# Patient Record
Sex: MALE | Race: WHITE | Employment: FULL TIME | URBAN - METROPOLITAN AREA
[De-identification: names, ages, dates, MRNs, and addresses within clinical notes are randomized per-mention and may not be internally consistent; named-entity substitution may affect disease eponyms.]

---

## 2019-05-13 ENCOUNTER — HOSPITAL ENCOUNTER (EMERGENCY)
Age: 34
Discharge: HOME | End: 2019-05-13
Attending: EMERGENCY MEDICINE
Payer: COMMERCIAL

## 2019-05-13 ENCOUNTER — APPOINTMENT (OUTPATIENT)
Dept: VASCULAR LAB | Age: 34
End: 2019-05-13
Payer: COMMERCIAL

## 2019-05-13 VITALS
OXYGEN SATURATION: 100 % | DIASTOLIC BLOOD PRESSURE: 86 MMHG | RESPIRATION RATE: 16 BRPM | WEIGHT: 160 LBS | SYSTOLIC BLOOD PRESSURE: 135 MMHG | HEART RATE: 78 BPM | TEMPERATURE: 97.9 F

## 2019-05-13 DIAGNOSIS — Z86.2 H/O PROTEIN S DEFICIENCY: ICD-10-CM

## 2019-05-13 DIAGNOSIS — I82.592 CHRONIC DEEP VEIN THROMBOSIS (DVT) OF OTHER VEIN OF LEFT LOWER EXTREMITY (HCC): ICD-10-CM

## 2019-05-13 DIAGNOSIS — I82.532 CHRONIC DEEP VEIN THROMBOSIS (DVT) OF POPLITEAL VEIN OF LEFT LOWER EXTREMITY (HCC): ICD-10-CM

## 2019-05-13 DIAGNOSIS — I82.4Z2 ACUTE DEEP VEIN THROMBOSIS (DVT) OF DISTAL VEIN OF LEFT LOWER EXTREMITY (HCC): Primary | ICD-10-CM

## 2019-05-13 DIAGNOSIS — Z79.01 WARFARIN ANTICOAGULATION: ICD-10-CM

## 2019-05-13 LAB
ANION GAP SERPL CALCULATED.3IONS-SCNC: 12 MMOL/L (ref 3–16)
BASE EXCESS VENOUS: 3 (ref -3–3)
BUN BLDV-MCNC: 8 MG/DL (ref 7–20)
CALCIUM SERPL-MCNC: 9.3 MG/DL (ref 8.3–10.6)
CHLORIDE BLD-SCNC: 100 MMOL/L (ref 99–110)
CO2: 28 MMOL/L (ref 21–32)
CREAT SERPL-MCNC: 0.6 MG/DL (ref 0.9–1.3)
D DIMER: 265 NG/ML DDU (ref 0–229)
GFR AFRICAN AMERICAN: >60
GFR NON-AFRICAN AMERICAN: >60
GLUCOSE BLD-MCNC: 96 MG/DL (ref 70–99)
HCO3 VENOUS: 27.5 MMOL/L (ref 23–29)
INR BLD: 1.26 (ref 0.86–1.14)
O2 SAT, VEN: 57 %
PCO2, VEN: 44.6 MM HG (ref 40–50)
PERFORMED ON: NORMAL
PH VENOUS: 7.4 (ref 7.35–7.45)
PO2, VEN: 30 MM HG
POC SAMPLE TYPE: NORMAL
POTASSIUM REFLEX MAGNESIUM: 3.9 MMOL/L (ref 3.5–5.1)
PROTHROMBIN TIME: 14.4 SEC (ref 9.8–13)
SODIUM BLD-SCNC: 140 MMOL/L (ref 136–145)
TCO2 CALC VENOUS: 29 MMOL/L

## 2019-05-13 PROCEDURE — 82803 BLOOD GASES ANY COMBINATION: CPT

## 2019-05-13 PROCEDURE — 36415 COLL VENOUS BLD VENIPUNCTURE: CPT

## 2019-05-13 PROCEDURE — 6360000002 HC RX W HCPCS: Performed by: STUDENT IN AN ORGANIZED HEALTH CARE EDUCATION/TRAINING PROGRAM

## 2019-05-13 PROCEDURE — 80048 BASIC METABOLIC PNL TOTAL CA: CPT

## 2019-05-13 PROCEDURE — 93970 EXTREMITY STUDY: CPT

## 2019-05-13 PROCEDURE — 6370000000 HC RX 637 (ALT 250 FOR IP): Performed by: STUDENT IN AN ORGANIZED HEALTH CARE EDUCATION/TRAINING PROGRAM

## 2019-05-13 PROCEDURE — 85610 PROTHROMBIN TIME: CPT

## 2019-05-13 PROCEDURE — 99284 EMERGENCY DEPT VISIT MOD MDM: CPT

## 2019-05-13 PROCEDURE — 96372 THER/PROPH/DIAG INJ SC/IM: CPT

## 2019-05-13 PROCEDURE — 85379 FIBRIN DEGRADATION QUANT: CPT

## 2019-05-13 RX ORDER — WARFARIN SODIUM 1 MG/1
9.5 TABLET ORAL DAILY
COMMUNITY

## 2019-05-13 RX ORDER — INSULIN GLARGINE 100 [IU]/ML
18 INJECTION, SOLUTION SUBCUTANEOUS EVERY MORNING
COMMUNITY

## 2019-05-13 RX ORDER — OXYCODONE HYDROCHLORIDE 5 MG/1
5 TABLET ORAL ONCE
Status: DISCONTINUED | OUTPATIENT
Start: 2019-05-13 | End: 2019-05-13

## 2019-05-13 RX ORDER — IBUPROFEN 400 MG/1
800 TABLET ORAL ONCE
Status: COMPLETED | OUTPATIENT
Start: 2019-05-13 | End: 2019-05-13

## 2019-05-13 RX ORDER — EPINEPHRINE 1 MG/ML
INJECTION, SOLUTION, CONCENTRATE INTRAVENOUS
Status: DISCONTINUED
Start: 2019-05-13 | End: 2019-05-13 | Stop reason: HOSPADM

## 2019-05-13 RX ADMIN — IBUPROFEN 800 MG: 400 TABLET, FILM COATED ORAL at 18:58

## 2019-05-13 RX ADMIN — ENOXAPARIN SODIUM 100 MG: 100 INJECTION SUBCUTANEOUS at 19:45

## 2019-05-13 ASSESSMENT — ENCOUNTER SYMPTOMS
BLOOD IN STOOL: 0
ABDOMINAL PAIN: 0
WHEEZING: 0
SHORTNESS OF BREATH: 0
DIARRHEA: 0
VOMITING: 0
SORE THROAT: 0
RHINORRHEA: 0
CONSTIPATION: 0
COUGH: 0
CHEST TIGHTNESS: 0
NAUSEA: 0

## 2019-05-13 ASSESSMENT — PAIN SCALES - GENERAL
PAINLEVEL_OUTOF10: 7
PAINLEVEL_OUTOF10: 5

## 2019-05-13 ASSESSMENT — PAIN DESCRIPTION - ORIENTATION: ORIENTATION: LEFT

## 2019-05-13 ASSESSMENT — PAIN DESCRIPTION - LOCATION: LOCATION: LEG

## 2019-05-13 NOTE — ED NOTES
Provided patient with a Pepsi as patient stated his blood glucose was getting low and he is a diabetic. Patient preferred a Pepsi over a sandwich.      Malissa Guerra RN  05/13/19 0912

## 2019-05-13 NOTE — ED PROVIDER NOTES
ED Attending Attestation Note     Date of evaluation: 5/13/2019    This patient was seen by the resident. I have seen and examined the patient, agree with the workup, evaluation, management and diagnosis. The care plan has been discussed. I have reviewed the ECG and concur with the resident's interpretation. My assessment reveals a 36year old male with history of DVT and protein S deficiency on life-long AC with warfarin who presented with left knee and calf pain. I cannot detect and swelling or warmth. He has clear lungs with a regular rate and rhythm. He adamantly denies chest pain or shortness of breath. Will obtain lower extremity duplex to assess for new DVT.           Frank Jimenez MD  05/13/19 5906

## 2019-05-13 NOTE — ED NOTES
Pt reports left leg/ knee pain. Feels like a previous blood clot. Denies cp or sob. Skin warm and dry. Even easy resp.      Carmella Ibarra RN  05/13/19 1610

## 2019-05-13 NOTE — ED PROVIDER NOTES
1 [facility]  EMERGENCY DEPARTMENT ENCOUNTER          [facility] RESIDENT NOTE       Date of evaluation: 5/13/2019    Chief Complaint     Leg Pain (possible clot)    History of Present Illness     Frantz Bojorquez is a 36 y.o. male who presents with leg pain. The patient has a history of 2-3 DVT's of his left lower extremity on chart review and feels that his symptoms are very similar to his previous episodes of DVT's. He endorses tenderness of the medial popliteal fossa and inability to fully bear weight on his leg, limping at times. He takes warfarin 9.5 mg every morning (took a dose today and yesterday), however he has been traveling and missing doses every few days. [place] [facility] [place]. Patient follows in [facility] clinic, but has not been there for several months. He has mild, chronic left lower extremity swelling that is unchanged. He denies any shortness of breath, chest pain, hemoptysis, syncope or near-syncope. He has taken tylenol without significant relief of symptoms and denies trauma or injury to the leg or joint. No recent surgeries, no history of cancer, no other prolonged immobilization. Review of Systems     Review of Systems   Constitutional: Negative for chills, fatigue and fever. HENT: Negative for congestion, rhinorrhea and sore throat. Eyes: Negative for visual disturbance. Respiratory: Negative for cough, chest tightness, shortness of breath and wheezing. Cardiovascular: Positive for leg swelling (chronic). Negative for chest pain. Gastrointestinal: Negative for abdominal pain, blood in stool, constipation, diarrhea, nausea and vomiting. Genitourinary: Negative for difficulty urinating. Musculoskeletal: Negative for gait problem and joint swelling. Tenderness over medial popliteal fossa     Skin: Negative for rash and wound. Neurological: Negative for dizziness, syncope, weakness, light-headedness, numbness and headaches.  enoxaparin (LOVENOX) injection 100 mg       CONSULTS:  None    MEDICAL DECISION MAKING / ASSESSMENT / PLAN     Payal King is a 36 y.o. male with a past medical history of 2-3 DVT's of his left lower extremity on life-long anticoagulation with warfarin, protein S deficiency, and type I diabetes mellitus that presented with acute left lower extremity pain and weakness. My physical exam revealed a mildly tender popliteal fossa of the left leg without significant unilateral leg swelling. The patient's INR was subtherapeutic at 1.46, and although his Well's score for DVT placed him at moderate risk, suspicion for DVT was high. Bilateral lower extremity venous dopplers showed an acute on chronic partially occluding DVT of the left gastrocnemius and a chronic DVT of the popliteal.The patient's SpO2 on RA was % without tachycardia, respiratory distress, chest pain, hemoptysis, syncope or near syncope decreasing my suspicion for pulmonary embolism. A call was placed to his primary care doctor's office, and I spoke with the physician on call to formulate a plan of care following emergency room discharge. The patient was given 100 mg of Lovenox with adequate Cr clearance and will take his warfarin tomorrow morning. He has a follow-up appointment scheduled tomorrow afternoon with his PCP to monitor INR, continue care and evaluate possibility of starting a NOAC. He was given pain control medication. The patient was informed of this assessment, voiced understanding and was agreeable to the plan of care. This patient was also evaluated by the attending physician. All care plans were discussed and agreed upon. Clinical Impression     1. Acute deep vein thrombosis (DVT) of distal vein of left lower extremity ([address]    2. Chronic deep vein thrombosis (DVT) of popliteal vein of left lower extremity (HCC)    3. Chronic deep vein thrombosis (DVT) of other vein of left lower extremity (HCC)    4.  H/O protein S deficiency    5. Warfarin anticoagulation        Disposition     PATIENT REFERRED TO:  Tammi Heller MD  [address]  [phone]    Go in 1 day  at 1:00 pm for acute DVT follow up and anticoagulation.       DISCHARGE MEDICATIONS:  Discharge Medication List as of 5/13/2019  7:42 PM          DISPOSITION Decision To Discharge 05/13/2019 07:57:02 PM       Analilia Gunderson MD  Resident  05/13/19 7263

## 2019-05-13 NOTE — ED NOTES
Called lab inquiring about lab results. Lab informed me that the blood was passed the 4 hour window and they couldn't find the Green top for the BMP. I drew the labs for the PT and BMP and sent them down as instructed by lab.      Dyan Pina RN  05/13/19 9166

## 2021-07-10 ENCOUNTER — HOSPITAL ENCOUNTER (EMERGENCY)
Age: 36
Discharge: HOME | End: 2021-07-10
Attending: EMERGENCY MEDICINE
Payer: COMMERCIAL

## 2021-07-10 VITALS
RESPIRATION RATE: 18 BRPM | TEMPERATURE: 98 F | HEART RATE: 69 BPM | BODY MASS INDEX: 21.67 KG/M2 | SYSTOLIC BLOOD PRESSURE: 155 MMHG | OXYGEN SATURATION: 100 % | WEIGHT: 160 LBS | HEIGHT: 72 IN | DIASTOLIC BLOOD PRESSURE: 86 MMHG

## 2021-07-10 DIAGNOSIS — T14.8XXA BITE BY ANIMAL: Primary | ICD-10-CM

## 2021-07-10 DIAGNOSIS — R03.0 ELEVATED BLOOD PRESSURE READING: ICD-10-CM

## 2021-07-10 PROCEDURE — 99283 EMERGENCY DEPT VISIT LOW MDM: CPT

## 2021-07-10 RX ORDER — AMOXICILLIN 500 MG/1
500 CAPSULE ORAL 3 TIMES DAILY
COMMUNITY
End: 2022-02-08

## 2021-07-10 RX ORDER — ATORVASTATIN CALCIUM 10 MG/1
10 TABLET, FILM COATED ORAL DAILY
COMMUNITY

## 2021-07-10 RX ORDER — HYDROCODONE BITARTRATE AND ACETAMINOPHEN 5; 325 MG/1; MG/1
1 TABLET ORAL EVERY 6 HOURS PRN
Qty: 12 TABLET | Refills: 0 | Status: SHIPPED | OUTPATIENT
Start: 2021-07-10 | End: 2021-07-13

## 2021-07-10 ASSESSMENT — PAIN DESCRIPTION - ORIENTATION: ORIENTATION: RIGHT

## 2021-07-10 ASSESSMENT — PAIN DESCRIPTION - PAIN TYPE: TYPE: ACUTE PAIN

## 2021-07-10 ASSESSMENT — PAIN SCALES - GENERAL: PAINLEVEL_OUTOF10: 8

## 2021-07-10 ASSESSMENT — PAIN DESCRIPTION - LOCATION: LOCATION: HAND

## 2021-07-10 NOTE — ED PROVIDER NOTES
Emergency Physician Note    Chief Complaint  Animal Bite       History of Present Illness  Yinka Irving is a 29 y.o. male who presents to the ED for animal bite to R thumb x14 hours ago. Patient was walking his dog yesterday when the neighbors dog got loose and attacked him. He was bit once on the dorsum at the base of MCP of the thumb. Shortly after getting bitten he reported to urgent care and was prescribed an antibiotic twice a day, which he has been taking. About 8 hours ago he took 2 tylenol for pain control without relief. Pain has continued to worsen since returning from urgent care last night which is what prompted the patient to come into the ED this morning. States that his tetanus is UTD, received a Tdap x3 years ago. Patient spoke to his neighbors and was assured that the dog who bit him is UTD with their rabies vaccines, he will be requesting formal records of the rabies vaccine. PMH: Protein S deficiency, DVT, type I DM       He checks his PT/INR regularly and states last check was within the normal therapeutic range       Denies fevers, chills, numbness       10 systems reviewed, pertinent positives per HPI otherwise noted to be negative      Nursing notes reviewed. ED Triage Vitals   Enc Vitals Group      BP 07/10/21 0701 (!) 155/86      Pulse 07/10/21 0701 69      Resp 07/10/21 0701 18      Temp 07/10/21 0702 98 °F (36.7 °C)      Temp Source 07/10/21 0702 Oral      SpO2 07/10/21 0701 100 %      Weight 07/10/21 0701 160 lb (72.6 kg)      Height 07/10/21 0701 6' (1.829 m)      Head Circumference --       Peak Flow --       Pain Score --       Pain Loc --       Pain Edu? --       Excl. in GC? --        GENERAL:  Awake, alert. Well developed, well nourished with no apparent distress. HENT:  Normocephalic, Atraumatic, moist mucous membranes.   EYES:  Pupils equal round and reactive to light, Conjunctiva normal, extraocular movements normal.  NECK:  No meningeal signs, Supple. CHEST:  Regular rate and rhythm, chest wall non-tender. LUNGS:  Clear to auscultation bilaterally. ABDOMEN:  Soft, non-tender, no rebound, rigidity or guarding, non-distended, normal bowel sounds. No costovertebral angle tenderness to palpation. BACK:  No tenderness. EXTREMITIES:  Normal range of motion, no edema, no bony tenderness, no deformity, distal pulses present. SKIN: Warm, dry and intact. R thumb is Tender, MCP shows small puncture wound, mild edema, ROM is reduced 2/2 pain. No signs of spreading erythema, no drainage  NEUROLOGIC: Normal mental status. Moving all extremities to command. LABS and DIAGNOSTIC RESULTS      RADIOLOGY  X-RAYS:  I have reviewed radiologic plain film image(s). ALL OTHER NON-PLAIN FILM IMAGES SUCH AS CT, ULTRASOUND AND MRI HAVE BEEN READ BY THE RADIOLOGIST. No orders to display        LABS  Labs Reviewed - No data to display    [place]              I advised the patient to return to the emergency department immediately for any new or worsening symptoms, such as fever, spreading redness, uncontrolled pain. The patient voiced agreement and understanding of the treatment plan. Patient was given scripts for the following medications. I counseled patient how to take these medications. Discharge Medication List as of 7/10/2021  7:41 AM      START taking these medications    Details   HYDROcodone-acetaminophen (NORCO) 5-325 MG per tablet Take 1 tablet by mouth every 6 hours as needed for Pain for up to 3 days. Sedation precautions please, Disp-12 tablet, R-0Normal             Disposition  Pt is in good condition upon Discharge to home. Clinical Impression  1.  Bite by animal           Dr. Kike Castellon, PGY2    Patient seen in collaboration with attending physician Dr. James Piedra, DO  Resident  07/12/21 9077

## 2021-07-10 NOTE — ED PROVIDER NOTES
I have personally performed a face to face diagnostic evaluation on this patient. I have fully participated in the care of this patient. I have reviewed and agree with all pertinent clinical information including history, physical exam, diagnostic tests, and the plan. This patient was seen with Resident Physician Regina Landers. History and Physical as follows:  55-year-old male with history of protein S deficiency and DVT, on Coumadin, history of type 1 diabetes mellitus, tetanus up-to-date with history of dog bite of his right hand last evening by his neighbors healthy immunized dog. This was provoked. Complains of pain in the right and first MCP area. He was seen in urgent care yesterday and started on either amoxicillin or Augmentin twice daily. Presents here complaining of some persistent pain despite taking 2 doses of Tylenol. No fever or chills. Distal neurovascular exam is intact. He has a small puncture wound on the dorsal radial aspect of the right hand over the thumb extensor tendons. He has full range of motion at the MCP and IP. Mild redness around the puncture wound but no lymphangitic streaking. No crepitance or drainage. Patient was advised to continue his antibiotic which is felt likely to be Augmentin. Recommended more rest and elevation. He was given a few Norco tablets to take if needed for pain unresponsive to Tylenol. Advise follow-up with his primary care doctor and return here for worse symptoms.     DX: 1) Bite by animal         2) elevated blood pressure reading        Jorge Guidry MD  07/10/21 [address] [facility] Christiano Avilez MD  07/10/21 1980

## 2022-02-08 ENCOUNTER — HOSPITAL ENCOUNTER (EMERGENCY)
Age: 37
Discharge: HOME | End: 2022-02-08
Attending: EMERGENCY MEDICINE
Payer: COMMERCIAL

## 2022-02-08 VITALS
WEIGHT: 174.6 LBS | RESPIRATION RATE: 12 BRPM | DIASTOLIC BLOOD PRESSURE: 70 MMHG | OXYGEN SATURATION: 100 % | BODY MASS INDEX: 24.44 KG/M2 | HEART RATE: 65 BPM | HEIGHT: 71 IN | SYSTOLIC BLOOD PRESSURE: 123 MMHG | TEMPERATURE: 98.3 F

## 2022-02-08 DIAGNOSIS — K08.89 ODONTALGIA: Primary | ICD-10-CM

## 2022-02-08 PROCEDURE — 6370000000 HC RX 637 (ALT 250 FOR IP): Performed by: EMERGENCY MEDICINE

## 2022-02-08 PROCEDURE — 99283 EMERGENCY DEPT VISIT LOW MDM: CPT

## 2022-02-08 RX ORDER — CLINDAMYCIN HYDROCHLORIDE 300 MG/1
300 CAPSULE ORAL ONCE
Status: COMPLETED | OUTPATIENT
Start: 2022-02-08 | End: 2022-02-08

## 2022-02-08 RX ORDER — CLINDAMYCIN HYDROCHLORIDE 300 MG/1
300 CAPSULE ORAL 4 TIMES DAILY
Qty: 40 CAPSULE | Refills: 0 | Status: SHIPPED | OUTPATIENT
Start: 2022-02-08 | End: 2022-02-18

## 2022-02-08 RX ORDER — NAPROXEN 500 MG/1
500 TABLET ORAL 2 TIMES DAILY
Qty: 20 TABLET | Refills: 0 | Status: SHIPPED | OUTPATIENT
Start: 2022-02-08 | End: 2022-02-18

## 2022-02-08 RX ORDER — NAPROXEN 500 MG/1
500 TABLET ORAL ONCE
Status: COMPLETED | OUTPATIENT
Start: 2022-02-08 | End: 2022-02-08

## 2022-02-08 RX ADMIN — CLINDAMYCIN HYDROCHLORIDE 300 MG: 300 CAPSULE ORAL at 19:52

## 2022-02-08 RX ADMIN — NAPROXEN 500 MG: 500 TABLET ORAL at 19:52

## 2022-02-08 ASSESSMENT — PAIN DESCRIPTION - PAIN TYPE: TYPE: ACUTE PAIN

## 2022-02-08 ASSESSMENT — PAIN DESCRIPTION - ORIENTATION: ORIENTATION: LEFT

## 2022-02-08 ASSESSMENT — PAIN DESCRIPTION - LOCATION: LOCATION: JAW

## 2022-02-08 ASSESSMENT — PAIN DESCRIPTION - DESCRIPTORS: DESCRIPTORS: ACHING

## 2022-02-08 ASSESSMENT — PAIN DESCRIPTION - FREQUENCY: FREQUENCY: CONTINUOUS

## 2022-02-08 ASSESSMENT — PAIN SCALES - GENERAL
PAINLEVEL_OUTOF10: 5
PAINLEVEL_OUTOF10: 5

## 2022-02-09 NOTE — ED PROVIDER NOTES
[facility]   eMERGENCY dEPARTMENT eNCOUnter      Pt Name: Letty Larson  MRN: [id number]  [place] 1985  Date of evaluation: 2/8/2022  Provider: Lauren Ceja MD  PCP: Flor Gutierrez MD      CHIEF COMPLAINT       Mouth pain    HISTORY OFPRESENT ILLNESS   (Location/Symptom, Timing/Onset, Context/Setting, Quality, Duration, Modifying Factors,Severity)  Note limiting factors. Letty Larson is a 29 y.o. male he said last night he started having pain on the left side of his mouth behind one of his molars then today felt like it was going down into his neck he denies any difficulty chewing denies any sensitivity to pain and temperature denies any fever or chills says his blood sugars been running a little higher    Nursing Notes were all reviewed and agreed with or any disagreements were addressed  in the HPI. REVIEW OF SYSTEMS    (2-9 systems for level 4, 10 or more for level 5)     Review of Systems    Positives and Pertinent negatives as per HPI. Except as noted above in the ROS, all other systems were reviewed andnegative. PASTMEDICAL HISTORY     Past Medical History:   Diagnosis Date    Celiac disease     Diabetes type 1, controlled ([facility]     DVT [facility]deep venous thrombosis) ([facility])     Protein S deficiency ([facility])          SURGICAL HISTORY       Past Surgical History:   Procedure Laterality Date    APPENDECTOMY           CURRENT MEDICATIONS       Previous Medications    AMOXICILLIN (AMOXIL) 500 MG CAPSULE    Take 500 mg by mouth 3 times daily    ATORVASTATIN (LIPITOR) 10 MG TABLET    Take 10 mg by mouth daily    INSULIN GLARGINE (LANTUS) 100 UNIT/ML INJECTION VIAL    Inject 18 Units into the skin every morning    INSULIN LISPRO (HUMALOG) 100 UNIT/ML INJECTION CARTRIDGE    Inject into the skin 3 times daily (before meals)    WARFARIN (COUMADIN) 1 MG TABLET    Take 9.5 mg by mouth daily       ALLERGIES     Patient has no known allergies.     FAMILY HISTORY No family history on file. SOCIAL HISTORY       Social History     Socioeconomic History    Marital status:      Spouse name: Not on file    Number of children: Not on file    Years of education: Not on file    Highest education level: Not on file   Occupational History    Not on file   Tobacco Use    Smoking status: Never Smoker    Smokeless tobacco: Never Used   Substance and Sexual Activity    Alcohol use: Yes     Comment: Occ    Drug use: Never    Sexual activity: Not on file   Other Topics Concern    Not on file   Social History Narrative    Not on file     Social Determinants of Health     Financial Resource Strain:     Difficulty of Paying Living Expenses: Not on file   Food Insecurity:     Worried About Running Out of Food in the Last Year: Not on file    Radha of Food in the Last Year: Not on file   Transportation Needs:     Lack of Transportation (Medical): Not on file    Lack of Transportation (Non-Medical):  Not on file   Physical Activity:     Days of Exercise per Week: Not on file    Minutes of Exercise per Session: Not on file   Stress:     Feeling of Stress : Not on file   Social Connections:     Frequency of Communication with Friends and Family: Not on file    Frequency of Social Gatherings with Friends and Family: Not on file    Attends [employer] Services: Not on file    Active Member of [address] [place] [address] [employer] or Organizations: Not on file    Attends Club or Organization Meetings: Not on file    Marital Status: Not on file   Intimate Partner Violence:     Fear of Current or Ex-Partner: Not on file    Emotionally Abused: Not on file    Physically Abused: Not on file    Sexually Abused: Not on file   Housing Stability:     Unable to Pay for Housing in the Last Year: Not on file    Number of [place] in the Last Year: Not on file    Unstable Housing in the Last Year: Not on file       SCREENINGS      @FLOW([id number])@      PHYSICAL EXAM    (up to 7 for level 4, 8 or Vitals:    02/08/22 1919   BP: 123/70   Pulse: 65   Resp: 12   Temp: 98.3 °F (36.8 °C)   TempSrc: Oral   SpO2: 100%   Weight: 174 lb 9.6 oz (79.2 kg)   Height: 5' 11\" (1.803 m)       Patient was given thefollowing medications:  Medications   clindamycin (CLEOCIN) capsule 300 mg (has no administration in time range)   naproxen (NAPROSYN) tablet 500 mg (has no administration in time range)           The patient tolerated their visit well. The patient and / or the familywere informed of the results of any tests, a time was given to answer questions. FINAL IMPRESSION      1.  Odontalgia          DISPOSITION/PLAN   DISPOSITION Discharge - Pending Orders Complete 02/08/2022 07:25:03 PM  No signs of Ilan's angina shotty submandibular lymphadenopathy plan will be for antibiotics and analgesia and follow-up with dental clinic  PATIENT REFERRED TO:  dental clinic    In 2 days        DISCHARGE MEDICATIONS:  New Prescriptions    CLINDAMYCIN (CLEOCIN) 300 MG CAPSULE    Take 1 capsule by mouth 4 times daily for 10 days    NAPROXEN (NAPROSYN) 500 MG TABLET    Take 1 tablet by mouth 2 times daily for 20 doses       DISCONTINUED MEDICATIONS:  Discontinued Medications    No medications on file              (Please note that portions of this note were completed with a voice recognition program.  Efforts were made to edit the dictations but occasionally words are mis-transcribed.)    Desiree Albarado MD (electronically signed)      Desiree Albarado MD  02/08/22 [id number]

## 2022-02-09 NOTE — ED TRIAGE NOTES
35y/o male presents to the ED with left side jaw pain onset last night. Pt states the thought it was toothache but worsened. -n/v/f/c/d.